# Patient Record
Sex: FEMALE | Race: WHITE
[De-identification: names, ages, dates, MRNs, and addresses within clinical notes are randomized per-mention and may not be internally consistent; named-entity substitution may affect disease eponyms.]

---

## 2023-08-16 ENCOUNTER — HOSPITAL ENCOUNTER (EMERGENCY)
Dept: HOSPITAL 97 - ER | Age: 43
Discharge: HOME | End: 2023-08-16
Payer: COMMERCIAL

## 2023-08-16 VITALS — TEMPERATURE: 98 F | DIASTOLIC BLOOD PRESSURE: 82 MMHG | SYSTOLIC BLOOD PRESSURE: 132 MMHG

## 2023-08-16 VITALS — OXYGEN SATURATION: 100 %

## 2023-08-16 DIAGNOSIS — M79.641: Primary | ICD-10-CM
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
## 2023-08-16 NOTE — RAD REPORT
EXAM DESCRIPTION:  RAD - Hand Right 3 View - 8/16/2023 6:54 pm

 

CLINICAL HISTORY:  PAIN

 

COMPARISON:  <Comparisons>

 

FINDINGS:  No bone or joint abnormality is seen.
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
